# Patient Record
Sex: MALE | Race: WHITE | Employment: FULL TIME | URBAN - METROPOLITAN AREA
[De-identification: names, ages, dates, MRNs, and addresses within clinical notes are randomized per-mention and may not be internally consistent; named-entity substitution may affect disease eponyms.]

---

## 2017-05-01 ENCOUNTER — HOSPITAL ENCOUNTER (EMERGENCY)
Age: 47
Discharge: HOME | End: 2017-05-01
Payer: COMMERCIAL

## 2017-05-01 ENCOUNTER — APPOINTMENT (OUTPATIENT)
Dept: GENERAL RADIOLOGY | Age: 47
End: 2017-05-01
Payer: COMMERCIAL

## 2017-05-01 ENCOUNTER — PRIOR ORIGINAL RECORDS (OUTPATIENT)
Dept: OTHER | Age: 47
End: 2017-05-01

## 2017-05-01 ENCOUNTER — APPOINTMENT (OUTPATIENT)
Dept: CV DIAGNOSTICS | Age: 47
End: 2017-05-01
Attending: EMERGENCY MEDICINE
Payer: COMMERCIAL

## 2017-05-01 ENCOUNTER — APPOINTMENT (OUTPATIENT)
Dept: CV DIAGNOSTICS | Age: 47
End: 2017-05-01
Payer: COMMERCIAL

## 2017-05-01 VITALS
TEMPERATURE: 98 F | DIASTOLIC BLOOD PRESSURE: 70 MMHG | WEIGHT: 185 LBS | OXYGEN SATURATION: 99 % | SYSTOLIC BLOOD PRESSURE: 139 MMHG | HEIGHT: 70 IN | RESPIRATION RATE: 18 BRPM | HEART RATE: 88 BPM | BODY MASS INDEX: 26.48 KG/M2

## 2017-05-01 DIAGNOSIS — R00.2 PALPITATIONS: ICD-10-CM

## 2017-05-01 DIAGNOSIS — I10 ESSENTIAL HYPERTENSION: ICD-10-CM

## 2017-05-01 DIAGNOSIS — R07.9 ACUTE CHEST PAIN: Primary | ICD-10-CM

## 2017-05-01 PROCEDURE — 71010 XR CHEST AP PORTABLE  (CPT=71010): CPT

## 2017-05-01 PROCEDURE — 93005 ELECTROCARDIOGRAM TRACING: CPT

## 2017-05-01 PROCEDURE — 96374 THER/PROPH/DIAG INJ IV PUSH: CPT

## 2017-05-01 PROCEDURE — 93010 ELECTROCARDIOGRAM REPORT: CPT

## 2017-05-01 PROCEDURE — 99285 EMERGENCY DEPT VISIT HI MDM: CPT

## 2017-05-01 PROCEDURE — 85610 PROTHROMBIN TIME: CPT

## 2017-05-01 PROCEDURE — 85025 COMPLETE CBC W/AUTO DIFF WBC: CPT

## 2017-05-01 PROCEDURE — 96361 HYDRATE IV INFUSION ADD-ON: CPT

## 2017-05-01 PROCEDURE — 80053 COMPREHEN METABOLIC PANEL: CPT

## 2017-05-01 PROCEDURE — 93018 CV STRESS TEST I&R ONLY: CPT | Performed by: EMERGENCY MEDICINE

## 2017-05-01 PROCEDURE — 85730 THROMBOPLASTIN TIME PARTIAL: CPT

## 2017-05-01 PROCEDURE — 85378 FIBRIN DEGRADE SEMIQUANT: CPT

## 2017-05-01 PROCEDURE — 84484 ASSAY OF TROPONIN QUANT: CPT

## 2017-05-01 PROCEDURE — 93017 CV STRESS TEST TRACING ONLY: CPT

## 2017-05-01 PROCEDURE — 93350 STRESS TTE ONLY: CPT

## 2017-05-01 PROCEDURE — 93350 STRESS TTE ONLY: CPT | Performed by: EMERGENCY MEDICINE

## 2017-05-01 PROCEDURE — 84443 ASSAY THYROID STIM HORMONE: CPT

## 2017-05-01 RX ORDER — ONDANSETRON 2 MG/ML
4 INJECTION INTRAMUSCULAR; INTRAVENOUS ONCE
Status: COMPLETED | OUTPATIENT
Start: 2017-05-01 | End: 2017-05-01

## 2017-05-01 RX ORDER — ASPIRIN 81 MG/1
324 TABLET, CHEWABLE ORAL ONCE
Status: COMPLETED | OUTPATIENT
Start: 2017-05-01 | End: 2017-05-01

## 2017-05-01 RX ORDER — ONDANSETRON 2 MG/ML
INJECTION INTRAMUSCULAR; INTRAVENOUS
Status: DISCONTINUED
Start: 2017-05-01 | End: 2017-05-01

## 2017-05-01 RX ORDER — SODIUM CHLORIDE 9 MG/ML
1000 INJECTION, SOLUTION INTRAVENOUS ONCE
Status: DISCONTINUED | OUTPATIENT
Start: 2017-05-01 | End: 2017-05-01

## 2017-05-01 RX ORDER — MORPHINE SULFATE 2 MG/ML
2 INJECTION, SOLUTION INTRAMUSCULAR; INTRAVENOUS ONCE
Status: DISCONTINUED | OUTPATIENT
Start: 2017-05-01 | End: 2017-05-01

## 2017-05-01 RX ORDER — NITROGLYCERIN 0.4 MG/1
0.4 TABLET SUBLINGUAL ONCE
Status: COMPLETED | OUTPATIENT
Start: 2017-05-01 | End: 2017-05-01

## 2017-05-01 NOTE — ED PROVIDER NOTES
Patient Seen in: THE [facility] Emergency Department In [place]    History   Patient presents with:  Chest Pain Angina (cardiovascular)    Stated Complaint: chest pain     HPI    Patient is a 60-year-old male who denies significant past medical history on no 10 MG Oral Tab,  Take 1 tablet (10 mg total) by mouth daily. No family history on file.       Smoking Status: Never Smoker                      Smokeless Status: Never Used                        Alcohol Use: No                Review of Systems    Pos within normal limits   CBC W/ DIFFERENTIAL - Abnormal; Notable for the following:     Eosinophil Absolute 0.36 (*)     All other components within normal limits   TROPONIN I - Normal   D-DIMER - Normal    Narrative:     FEU = Fibrinogen Equivalent Units. nitroglycerin sublingual, laboratories were drawn he was placed on oxygen pulse oximeter and cardiac monitor, and portable chest x-rays performed. Labs including CMP, troponin, d-dimer are performed, TSH also ordered.   Patient was given IV normal saline b

## 2017-05-01 NOTE — PROGRESS NOTES
[facility] preliminary ER stress echo:  Patient walked 7:30 on Vamshi protocol, without handrails, achieving 94% heartrate, and denied cardiac symptoms. EKG appeared to show less than 1 mm ST depression in the inferior leads.   Patient t

## 2017-05-01 NOTE — ED INITIAL ASSESSMENT (HPI)
STS LEFT SIDED CP SINCE LAST NOC. STS PAIN RADIATES ACROSS ENTIRE CHEST. STS FEELING OF PALPITATIONS SINCE ONSET WITH SOB.

## 2017-05-03 ENCOUNTER — MYAURORA ACCOUNT LINK (OUTPATIENT)
Dept: OTHER | Age: 47
End: 2017-05-03

## 2017-05-03 ENCOUNTER — PRIOR ORIGINAL RECORDS (OUTPATIENT)
Dept: OTHER | Age: 47
End: 2017-05-03

## 2017-05-09 LAB
ALBUMIN: 4.3 G/DL
ALKALINE PHOSPHATATE(ALK PHOS): 78 IU/L
ALT (SGPT): 32 U/L
AST (SGOT): 18 U/L
BILIRUBIN TOTAL: 0.6 MG/DL
BUN: 14 MG/DL
CALCIUM: 9.6 MG/DL
CHLORIDE: 102 MEQ/L
CREATININE, SERUM: 1.19 MG/DL
GLUCOSE: 120 MG/DL
HEMATOCRIT: 42.7 %
HEMOGLOBIN: 14.9 G/DL
PLATELETS: 245 K/UL
POTASSIUM, SERUM: 3.4 MEQ/L
PROTEIN, TOTAL: 7.8 G/DL
RED BLOOD COUNT: 4.92 X 10-6/U
SGOT (AST): 18 IU/L
SGPT (ALT): 32 IU/L
SODIUM: 138 MEQ/L
THYROID STIMULATING HORMONE: 4.15 MLU/L
WHITE BLOOD COUNT: 8.8 X 10-3/U

## 2017-05-16 ENCOUNTER — HOSPITAL ENCOUNTER (OUTPATIENT)
Dept: CT IMAGING | Facility: HOSPITAL | Age: 47
Discharge: HOME | End: 2017-05-16
Attending: FAMILY MEDICINE

## 2017-05-16 DIAGNOSIS — Z13.6 SCREENING FOR HEART DISEASE: ICD-10-CM

## 2017-05-22 ENCOUNTER — PRIOR ORIGINAL RECORDS (OUTPATIENT)
Dept: OTHER | Age: 47
End: 2017-05-22

## 2017-05-24 ENCOUNTER — PRIOR ORIGINAL RECORDS (OUTPATIENT)
Dept: OTHER | Age: 47
End: 2017-05-24

## 2017-05-25 ENCOUNTER — TELEPHONE (OUTPATIENT)
Dept: CARDIAC REHAB | Facility: HOSPITAL | Age: 47
End: 2017-05-25

## 2017-06-02 ENCOUNTER — PRIOR ORIGINAL RECORDS (OUTPATIENT)
Dept: OTHER | Age: 47
End: 2017-06-02

## 2017-07-27 ENCOUNTER — OFFICE VISIT (OUTPATIENT)
Dept: SLEEP CENTER | Facility: HOSPITAL | Age: 47
End: 2017-07-27
Attending: INTERNAL MEDICINE
Payer: COMMERCIAL

## 2017-07-27 PROCEDURE — 95810 POLYSOM 6/> YRS 4/> PARAM: CPT

## 2017-08-01 NOTE — PROCEDURES
[address] [address]       Accredited by the [facility] ([facility])    PATIENT'S NAME:        Renetta Navarro  ATTENDING PHYSICIAN:   Olivia Dunn M.D. REFERRING PHYSICIAN:   Sarah Dempsey M.D.   MARY was 5.2 minutes. Wake after sleep onset was 18.5 minutes. During sleep, all stages of sleep were seen except for stage 1.   Slow-wave sleep comprised 22.3% of total sleep time; REM sleep occupied 15.2% of total sleep time with a REM latency of 158.7 minut seen in the supine position. 4.   If daytime sleepiness is a complaint, the patient needs to understand the potential dangers associated with reduced daytime vigilance. Thank you for your confidence in the [facility].   If you have any question

## 2017-10-26 ENCOUNTER — HOSPITAL ENCOUNTER (OUTPATIENT)
Dept: CT IMAGING | Age: 47
Discharge: HOME | End: 2017-10-26
Payer: COMMERCIAL

## 2017-10-26 DIAGNOSIS — I26.99 PULMONARY EMBOLISM (HCC): ICD-10-CM

## 2017-10-26 PROCEDURE — 71275 CT ANGIOGRAPHY CHEST: CPT | Performed by: INTERNAL MEDICINE

## 2017-10-26 PROCEDURE — 71275 CT ANGIOGRAPHY CHEST: CPT

## 2019-01-04 ENCOUNTER — HOSPITAL ENCOUNTER (OUTPATIENT)
Age: 49
Discharge: HOME | End: 2019-01-04
Attending: FAMILY MEDICINE
Payer: COMMERCIAL

## 2019-01-04 VITALS
RESPIRATION RATE: 16 BRPM | BODY MASS INDEX: 27 KG/M2 | OXYGEN SATURATION: 99 % | TEMPERATURE: 98 F | WEIGHT: 185 LBS | DIASTOLIC BLOOD PRESSURE: 83 MMHG | SYSTOLIC BLOOD PRESSURE: 144 MMHG | HEART RATE: 58 BPM

## 2019-01-04 DIAGNOSIS — J01.20 ACUTE NON-RECURRENT ETHMOIDAL SINUSITIS: Primary | ICD-10-CM

## 2019-01-04 PROCEDURE — 99204 OFFICE O/P NEW MOD 45 MIN: CPT

## 2019-01-04 PROCEDURE — 99213 OFFICE O/P EST LOW 20 MIN: CPT

## 2019-01-04 RX ORDER — AMOXICILLIN AND CLAVULANATE POTASSIUM 875; 125 MG/1; MG/1
875 TABLET, FILM COATED ORAL 2 TIMES DAILY
Qty: 20 TABLET | Refills: 0 | Status: SHIPPED | OUTPATIENT
Start: 2019-01-04 | End: 2020-09-21

## 2019-01-04 RX ORDER — PREDNISONE 20 MG/1
TABLET ORAL
Qty: 10 TABLET | Refills: 0 | Status: SHIPPED | OUTPATIENT
Start: 2019-01-04 | End: 2020-09-21

## 2019-01-04 NOTE — ED PROVIDER NOTES
Patient Seen in: [address] [facility]    History   Patient presents with:  Sinusitis    Stated Complaint: sinus pain    HPI    This 51-year-old male presents to the office with a 3-week history of worsening sinus pain and pressure.   Patient ad airway patent, uvula midline  NECK:  Shotty anterior cervical lymphadenopathy. No thyromegaly,  HEART: Regular rate and rhythm, no S3, S4 or murmur noted. LUNGS: Clear to ausculation. No retractions or tachypnea noted.   EXTREMITIES: No clubbing, cyanosis, Humidified air. Go to the emergency room if you have increased difficulty breathing. Follow-up with your primary doctor in 3-5 days if not improving.

## 2019-01-04 NOTE — ED INITIAL ASSESSMENT (HPI)
Pt states sinus congestion and pressure x 3 weeks. Getting worse. Post nasal drip. Increasing pressure and headaches. Taking otc sinus meds and tylenol with no relief.

## 2019-03-01 VITALS
HEART RATE: 74 BPM | WEIGHT: 185 LBS | SYSTOLIC BLOOD PRESSURE: 118 MMHG | DIASTOLIC BLOOD PRESSURE: 70 MMHG | HEIGHT: 70 IN | BODY MASS INDEX: 26.48 KG/M2

## 2020-09-09 ENCOUNTER — TELEPHONE (OUTPATIENT)
Dept: SURGERY | Facility: CLINIC | Age: 50
End: 2020-09-09

## 2020-09-21 ENCOUNTER — OFFICE VISIT (OUTPATIENT)
Dept: SURGERY | Facility: CLINIC | Age: 50
End: 2020-09-21
Payer: COMMERCIAL

## 2020-09-21 VITALS — RESPIRATION RATE: 16 BRPM | HEART RATE: 60 BPM | DIASTOLIC BLOOD PRESSURE: 76 MMHG | SYSTOLIC BLOOD PRESSURE: 116 MMHG

## 2020-09-21 DIAGNOSIS — M43.00 PARS LYSIS: ICD-10-CM

## 2020-09-21 DIAGNOSIS — M47.812 CERVICAL SPONDYLOSIS: Primary | ICD-10-CM

## 2020-09-21 DIAGNOSIS — M43.17 SPONDYLOLISTHESIS AT L5-S1 LEVEL: ICD-10-CM

## 2020-09-21 DIAGNOSIS — M54.16 LUMBAR RADICULITIS: ICD-10-CM

## 2020-09-21 PROCEDURE — 3078F DIAST BP <80 MM HG: CPT | Performed by: PHYSICIAN ASSISTANT

## 2020-09-21 PROCEDURE — 3074F SYST BP LT 130 MM HG: CPT | Performed by: PHYSICIAN ASSISTANT

## 2020-09-21 PROCEDURE — 99204 OFFICE O/P NEW MOD 45 MIN: CPT | Performed by: PHYSICIAN ASSISTANT

## 2020-09-21 RX ORDER — ACETAMINOPHEN 325 MG/1
325 TABLET ORAL AS NEEDED
COMMUNITY

## 2020-09-21 RX ORDER — IBUPROFEN 200 MG
200 TABLET ORAL AS NEEDED
COMMUNITY

## 2020-09-21 NOTE — PROGRESS NOTES
Patient: Robert Contreras  Medical Record Number: [id number]  PCP: Em Cain DO      HISTORY OF CHIEF COMPLAINT:    Robert Contreras is a 48year old male, who complains of 9 months h/o low back pain radiating down the back of his left leg.  He denies recen Never Used    Substance and Sexual Activity      Alcohol use:  Yes        Alcohol/week: 0.0 standard drinks        Types: 3 - 4 Standard drinks or equivalent per week        Comment: socially      Drug use: No     Current Medications:  Current Outpatient Me Clonus Neg Neg     Inspection: No acute distress. Patient displays non-antalgic gait, and is able to normal heel walk, normal toe walk.     Coordination: Well coordinated, Fluid gait    Cervical spine:    Neck is soft and supple with no masses or lymphade for results. In addition, his big complaint in ongoing low back pain with radiation to his left leg. He does have a spondylolisthesis at L5-S1 with L5 pars lysis.  We discussed that he may need surgery for this if conservative options are not providing

## 2020-09-21 NOTE — PROGRESS NOTES
Location of Pain: low back pain started 2/2020 with radiating into left leg, h/o broke back in 2006     Date Pain Began: 2/2020          Work Related:   No        Receiving Work Comp/Disability:   No    Numeric Rating Scale:  Pain at Present:  4

## 2020-09-30 ENCOUNTER — TELEPHONE (OUTPATIENT)
Dept: SURGERY | Facility: CLINIC | Age: 50
End: 2020-09-30

## 2020-09-30 NOTE — TELEPHONE ENCOUNTER
Spoke with patient who stated he will drop off CD of imaging to office tomorrow. Once CD is uploaded will need to forward message on to Ermelinda Dial and .

## 2020-09-30 NOTE — TELEPHONE ENCOUNTER
Rcvd fax from [facility] of MRI Cervical Spine dated 9/29/20, report ONLY. Endorsed to provider for review.

## 2020-10-01 NOTE — TELEPHONE ENCOUNTER
Called pt to review MRI. Prior MRI cervical spine is not available for comparison. Advised that he also needs to complete CT lumbar spine. Once this is completed, he will RTC to review.   He will bring discs with prior MRI cervical spine at that time so

## 2020-10-01 NOTE — TELEPHONE ENCOUNTER
Pt dropped off disk. Uploaded into PACS. Disk returned to patient. He would like someone to call him after they look at the images.

## 2020-10-08 ENCOUNTER — HOSPITAL ENCOUNTER (OUTPATIENT)
Dept: CT IMAGING | Age: 50
Discharge: HOME | End: 2020-10-08
Attending: PHYSICIAN ASSISTANT
Payer: COMMERCIAL

## 2020-10-08 DIAGNOSIS — M43.17 SPONDYLOLISTHESIS AT L5-S1 LEVEL: ICD-10-CM

## 2020-10-08 DIAGNOSIS — M54.16 LUMBAR RADICULITIS: ICD-10-CM

## 2020-10-08 DIAGNOSIS — M43.00 PARS LYSIS: ICD-10-CM

## 2020-10-08 PROCEDURE — 72131 CT LUMBAR SPINE W/O DYE: CPT | Performed by: PHYSICIAN ASSISTANT

## 2020-11-03 ENCOUNTER — OFFICE VISIT (OUTPATIENT)
Dept: SURGERY | Facility: CLINIC | Age: 50
End: 2020-11-03
Payer: COMMERCIAL

## 2020-11-03 VITALS — RESPIRATION RATE: 16 BRPM | SYSTOLIC BLOOD PRESSURE: 110 MMHG | HEART RATE: 72 BPM | DIASTOLIC BLOOD PRESSURE: 74 MMHG

## 2020-11-03 DIAGNOSIS — M54.16 LUMBAR RADICULITIS: ICD-10-CM

## 2020-11-03 DIAGNOSIS — M47.812 CERVICAL SPONDYLOSIS: Primary | ICD-10-CM

## 2020-11-03 DIAGNOSIS — M43.00 PARS LYSIS: ICD-10-CM

## 2020-11-03 DIAGNOSIS — M43.17 SPONDYLOLISTHESIS AT L5-S1 LEVEL: ICD-10-CM

## 2020-11-03 PROCEDURE — 3078F DIAST BP <80 MM HG: CPT | Performed by: NEUROLOGICAL SURGERY

## 2020-11-03 PROCEDURE — 3074F SYST BP LT 130 MM HG: CPT | Performed by: NEUROLOGICAL SURGERY

## 2020-11-03 PROCEDURE — 99213 OFFICE O/P EST LOW 20 MIN: CPT | Performed by: NEUROLOGICAL SURGERY

## 2020-11-03 PROCEDURE — 99072 ADDL SUPL MATRL&STAF TM PHE: CPT | Performed by: NEUROLOGICAL SURGERY

## 2020-11-03 NOTE — PROGRESS NOTES
Neurosurgery Clinic Visit  11/3/2020    Franki Otoole PCP:  DO VIKI Sinha 3/14/1970 MRN [id number]     HISTORY OF PRESENT ILLNESS:  Franki Otoole is a(n) 48year old male who is here for follow-up for neck and low back pain.   He had 3 cervical E station:  Normal.  Able to heel, toe, and tandem walk. Spine:  No neck/back pain on flexion and extension. Nontender to palpation over cervical/lumbar area without spasms. Negative Spurling's. Positive L'Hermitte's.        REVIEW OF STUDIES:  Imaging st

## 2020-11-03 NOTE — PROGRESS NOTES
Patient here for follow up on cervical and lumbar imaging. Patient c/o low back pain radiating to the left.  Constant pain 4/10 worse 7/10

## (undated) NOTE — ED AVS SNAPSHOT
THE [facility] Emergency Department in [address]    Phone:  [phone]    Fax:  [phone]           Grace Alexandra   MRN: [id number]    Department:  THE [facility] Emergency Department in [place]   Date of Visit: HYPERTENSION, TO BE CONFIRMED (ENGLISH)      Disclosure     Insurance plans vary and the physician(s) referred by the ER may not be covered by your plan. Please contact your insurance company to determine coverage for follow-up care and referrals.     Gabriel prescription right away and begin taking the medication(s) as directed    If the emergency physician has read X-rays, these will be re-interpreted by a radiologist.  If there is a significant change in your reading, you will be contacted.  Please make sure Medicaid plans. To get signed up and covered, please call [phone] and ask to get set up for an insurance coverage that is in-network with [address] [place] [address].         Imaging Results         XR CHEST AP PORTABLE  (CPT=71010) (Final result) Result

## (undated) NOTE — ED AVS SNAPSHOT
THE [facility] Emergency Department in [address]    Phone:  [phone]    Fax:  [phone]           [facility]   MRN: [id number]    Department:  THE [facility] Emergency Department in [place]   Date of Visit: IF THERE IS ANY CHANGE OR WORSENING OF YOUR CONDITION, CALL YOUR PRIMARY CARE PHYSICIAN AT ONCE OR RETURN IMMEDIATELY TO THE EMERGENCY DEPARTMENT.     If you have been prescribed any medication(s), please fill your prescription right away and begin taking t